# Patient Record
Sex: MALE | Race: WHITE
[De-identification: names, ages, dates, MRNs, and addresses within clinical notes are randomized per-mention and may not be internally consistent; named-entity substitution may affect disease eponyms.]

---

## 2019-11-08 ENCOUNTER — HOSPITAL ENCOUNTER (EMERGENCY)
Dept: HOSPITAL 44 - ED | Age: 59
Discharge: HOME | End: 2019-11-08
Payer: COMMERCIAL

## 2019-11-08 VITALS — DIASTOLIC BLOOD PRESSURE: 81 MMHG | SYSTOLIC BLOOD PRESSURE: 139 MMHG

## 2019-11-08 DIAGNOSIS — T25.221A: ICD-10-CM

## 2019-11-08 DIAGNOSIS — T31.10: ICD-10-CM
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
## 2019-11-08 DIAGNOSIS — T22.211A: Primary | ICD-10-CM

## 2019-11-08 DIAGNOSIS — T21.22XA: ICD-10-CM

## 2019-11-08 PROCEDURE — 96361 HYDRATE IV INFUSION ADD-ON: CPT

## 2019-11-08 PROCEDURE — 99282 EMERGENCY DEPT VISIT SF MDM: CPT

## 2019-11-08 PROCEDURE — 96374 THER/PROPH/DIAG INJ IV PUSH: CPT

## 2019-11-08 PROCEDURE — 99284 EMERGENCY DEPT VISIT MOD MDM: CPT

## 2019-11-08 PROCEDURE — S1016 NON-PVC INTRAVENOUS ADMINIST: HCPCS

## 2019-11-08 RX ADMIN — CLOSTRIDIUM TETANI TOXOID ANTIGEN (FORMALDEHYDE INACTIVATED), CORYNEBACTERIUM DIPHTHERIAE TOXOID ANTIGEN (FORMALDEHYDE INACTIVATED), BORDETELLA PERTUSSIS TOXOID ANTIGEN (GLUTARALDEHYDE INACTIVATED), BORDETELLA PERTUSSIS FILAMENTOUS HEMAGGLUTININ ANTIGEN (FORMALDEHYDE INACTIVATED), BORDETELLA PERTUSSIS PERTACTIN ANTIGEN, AND BORDETELLA PERTUSSIS FIMBRIAE 2/3 ANTIGEN ONE: 5; 2; 2.5; 5; 3; 5 INJECTION, SUSPENSION INTRAMUSCULAR at 18:51

## 2019-11-08 RX ADMIN — RETINOL, ERGOCALCIFEROL, .ALPHA.-TOCOPHEROL ACETATE, DL-, PHYTONADIONE, ASCORBIC ACID, NIACINAMIDE, RIBOFLAVIN 5-PHOSPHATE SODIUM, THIAMINE HYDROCHLORIDE, PYRIDOXINE HYDROCHLORIDE, DEXPANTHENOL, BIOTIN, FOLIC ACID, AND CYANOCOBALAMIN ONE MLS/HR: KIT at 17:48

## 2019-11-08 RX ADMIN — FENTANYL CITRATE ONE MCG: 50 INJECTION, SOLUTION INTRAMUSCULAR; INTRAVENOUS at 17:47

## 2019-11-08 RX ADMIN — MORPHINE SULFATE ONE: 10 INJECTION, SOLUTION INTRAMUSCULAR; INTRAVENOUS at 17:48

## 2019-11-08 RX ADMIN — MORPHINE SULFATE ONE: 10 INJECTION, SOLUTION INTRAMUSCULAR; INTRAVENOUS at 18:07
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
